# Patient Record
Sex: FEMALE | Race: WHITE | NOT HISPANIC OR LATINO | Employment: FULL TIME | URBAN - METROPOLITAN AREA
[De-identification: names, ages, dates, MRNs, and addresses within clinical notes are randomized per-mention and may not be internally consistent; named-entity substitution may affect disease eponyms.]

---

## 2024-09-26 LAB
HUMAN PAPILLOMAVIRUS (HPV): NORMAL
PAP RECOMMENDATION EXT: NORMAL
PAP SMEAR: NORMAL

## 2024-11-06 PROBLEM — Z97.3 WEARS CONTACT LENSES: Status: ACTIVE | Noted: 2024-11-06

## 2024-11-14 ENCOUNTER — PATIENT OUTREACH (OUTPATIENT)
Dept: ADMINISTRATIVE | Facility: HOSPITAL | Age: 34
End: 2024-11-14
Payer: COMMERCIAL

## 2024-11-14 ENCOUNTER — OFFICE VISIT (OUTPATIENT)
Dept: FAMILY MEDICINE | Facility: CLINIC | Age: 34
End: 2024-11-14
Payer: COMMERCIAL

## 2024-11-14 VITALS
WEIGHT: 167.75 LBS | BODY MASS INDEX: 28.64 KG/M2 | DIASTOLIC BLOOD PRESSURE: 88 MMHG | HEIGHT: 64 IN | OXYGEN SATURATION: 99 % | HEART RATE: 69 BPM | RESPIRATION RATE: 18 BRPM | SYSTOLIC BLOOD PRESSURE: 138 MMHG

## 2024-11-14 DIAGNOSIS — R01.1 HEART MURMUR: ICD-10-CM

## 2024-11-14 DIAGNOSIS — R03.0 ELEVATED BP WITHOUT DIAGNOSIS OF HYPERTENSION: ICD-10-CM

## 2024-11-14 DIAGNOSIS — Z00.01 ANNUAL VISIT FOR GENERAL ADULT MEDICAL EXAMINATION WITH ABNORMAL FINDINGS: Primary | ICD-10-CM

## 2024-11-14 PROCEDURE — 99999 PR PBB SHADOW E&M-EST. PATIENT-LVL IV: CPT | Mod: PBBFAC,,, | Performed by: FAMILY MEDICINE

## 2024-11-14 NOTE — PROGRESS NOTES
Assessment:       1. Annual visit for general adult medical examination with abnormal findings    2. Elevated BP without diagnosis of hypertension    3. Heart murmur        Assessment & Plan    Annual visit for general adult medical examination with abnormal findings  -     CBC Auto Differential; Future; Expected date: 11/14/2024  -     Comprehensive Metabolic Panel; Future; Expected date: 11/14/2024  -     Lipid Panel; Future; Expected date: 11/14/2024  -     TSH; Future; Expected date: 11/14/2024    Elevated BP without diagnosis of hypertension: Uncontrolled    Heart murmur: New problem workup needed  -     Echo; Future       Assessed elevated BP without formal diagnosis; today's readings 138/88 mmHg  Noted rare PVCs and PAs on previous 48-hour Holter monitor  Detected mild heart murmur on exam; family history of murmur and mother with mitral valve repair  Considered possible contributing factors to PVCs: dehydration, fertility treatment hormones, late eating  Evaluated previous lab results: CMP, CBC, liver enzymes, and cholesterol levels    HYPERTENSION:  Educated the patient on the importance of salt reduction for blood pressure management.  Discussed the benefits of adequate hydration for blood pressure control.  Advised the patient to increase water intake to maintain current level (2.5 32-ounce thermoses daily).  Recommend reducing salt intake, particularly avoiding table salt addition to cooked foods.  Instructed the patient to maintain current low alcohol intake.  Noted recent blood pressure readings: 145/90 during fertility treatments, 133/80 or 85 at home, and 138/88 during the current visit.  Advised the patient to log blood pressure readings for 1 month to determine if medication is needed.  Recommend dietary changes and exercise when allowed to help manage blood pressure naturally.  Scheduled follow up in 1 month if blood pressure log shows consistently elevated readings.    SUBCHORIONIC  HEMORRHAGE:  Noted that the patient's subchorionic hemorrhage seems to have resolved.  Reviewed ER visit labs showing slightly elevated white count, possibly due to the subchorionic hemorrhage.    DIABETES RISK:  Noted that mother has diabetes.  Acknowledged the patient's awareness of diabetes risk due to family history.    PALPITATIONS:  Discussed the benefits of adequate hydration for palpitations.  Explained the potential relationship between late eating and digestive issues affecting heart rhythm.  Noted that the patient reports daily PVCs, typically after eating.  Reviewed 48-hour Holter monitor results showing rare PVCs.  Discussed potential causes of PVCs, including dehydration and late-night eating.  Reviewed 48-hour Holter monitor results showing rare PACs.    HYPERLIPIDEMIA:  Noted that cholesterol was checked last year and was slightly high.  Discussed that LDL should be under 100, HDL was 79 (very good), and triglycerides were lower.  Recommend dietary changes to improve cholesterol levels.  Scheduled cholesterol recheck in 1 year.    HEART MURMUR:  Ordered an echocardiogram to evaluate mild heart murmur detected during exam.  Noted family history of heart murmur and mitral valve prolapse.        Noted that the patient is on progesterone and prenatal vitamins Instructed the patient to continue progesterone at the current dose.  Noted that the patient  is on activity restrictions.  Instructed the patient to continue prenatal vitamin supplementation.  Noted that the patient had her first OB appointment yesterday.  Reviewed OB-performed labs for hormones and other markers.    DIET AND NUTRITION:  Provided information on heart-healthy diet options, including Mediterranean and DASH diets.  Patient to limit fast food consumption.  Recommend allowing at least 3 hours between last meal and bedtime.  Patient to follow heart-healthy diet   (Mediterranean or DASH) including olive oil, avocados, fruits, and  vegetables.    EXERCISE:  Advised the patient to resume exercise once cleared by OB (currently on restriction).    HYDRATION:  Discussed benefits of adequate hydration for various health issues including headaches and joint pain.    FOLLOW UP:  Follow up in 1 year for annual check-up.  Contact the office if any concerns arise before next scheduled appointment.               Patient agreed with assessment and plan. Patient verbalized understanding.     Subjective:       Patient ID: Sil Zacarias is a 34 y.o. female.    Chief Complaint: Establish Care      History of Present Illness    CHIEF COMPLAINT:  Patient presents for an annual wellness visit and to establish care with a new provider after relocating.    HPI:  Patient reports a history of elevated blood pressure readings, particularly in medical settings. Blood pressure elevations to 145/90 were noted during fertility treatment appointments due to environmental stress. Home measurements with an electronic cuff typically show readings around 133/80 or 85, which she acknowledges as slightly elevated.    Patient's OB-GYN recommended maintaining a blood pressure log for a month, with potential medication consideration at her next appointment if readings remain consistently high. She is currently pregnant with a history of miscarriages, leading to exercise restrictions until the 12-week gestational serina.    Patient reports palpitations, specifically premature ventricular contractions (PVCs), which she documented on her Apple Watch. This led to a 48-hour Holter monitor test last October, revealing rare PVCs and premature atrial contractions (PACs). She continues to have daily PVCs, typically post-prandial.    Patient mentions a recent emergency room visit for a subchorionic hemorrhage, which has resolved. She also reports a history of headaches, believed to be related to dehydration. Increasing water intake to about 80 ounces daily has improved her  headaches.    Regarding sleep patterns, the patient reports generally adequate sleep but notes early morning awakening around 5 AM for urination, with difficulty returning to sleep, partly due to anticipating waking at 6:30 AM for medication.    Patient denies any current severe allergies, smoking, or significant alcohol consumption. She has not been formally diagnosed with hypertension.    MEDICATIONS:  Patient is on Progesterone and a prenatal vitamin.    MEDICAL HISTORY:  Patient has a history of elevated blood pressure without formal diagnosis, which is under observation. She also has premature ventricular contractions (PVCs), a history of miscarriages, and a mild heart murmur. She previously had a subchorionic hemorrhage, which has resolved. Patient's last Pap smear was performed by Dr. Ibarra, with records to be obtained. Her current contraception is progesterone. She is sexually active and currently pregnant. Patient has had multiple pregnancies, with a history of miscarriages and D&C procedures. She is currently on progesterone for her pregnancy.    FAMILY HISTORY:  Family history is significant for mother with diabetes, high blood pressure (resolved after mitral valve repair), and mitral valve repair. Her father has high blood pressure and a heart murmur. Patient's brother has autism and allergies.    SURGICAL HISTORY:  Patient has undergone a D&C procedure and wisdom teeth extraction of all teeth.    TEST RESULTS:  Patient's CMP shows sugar levels, kidney function, liver enzymes, and electrolytes within normal limits. Her CBC revealed a normal hemoglobin of 14.1. The cholesterol panel indicated elevated LDL (should be under   100), HDL of 79, and low triglycerides. Thyroid function test was normal at 2.4. During an ER visit, her labs showed a slightly elevated white blood cell count. Patient underwent a 48-hour Holter monitor in October last year, which showed rare premature atrial contractions (PACs) and  premature ventricular contractions (PVCs), with no intervention required. Hepatitis C and HIV tests performed at the [facility] were negative.    SOCIAL HISTORY:  Patient consumes 1-2 alcoholic drinks every 1-2 months. She does not smoke and has no history of drug abuse. She works as a physical therapist.    ROS:  ROS as indicated in HPI.          Past medical history, past social history, past Family history, past surgical history was reviewed and discussed with the patient.        Objective:      Physical Exam      Vitals: Weight: 167 lbs. Blood pressure: 138/88. Pulse: 69. SpO2: 99%.  General: No acute distress. Well-developed. Well-nourished.  Eyes: EOMI. Sclerae anicteric.  HENT: Normocephalic. Atraumatic. Nares patent. Moist oral mucosa.  Ears: Cerumen in ear.  Cardiovascular: Regular rate. Regular rhythm.  Presence of murmur. No rubs. No gallops. Normal S1, S2.  Respiratory: Normal respiratory effort. Clear to auscultation bilaterally. No rales. No rhonchi. No wheezing.  Musculoskeletal: No  obvious deformity.  Extremities: No lower extremity edema.  Neurological: Alert & oriented x3. No slurred speech. Normal gait.  Psychiatric: Normal mood. Normal affect. Good insight. Good judgment.  Skin: Warm. Dry. No rash.                   This note was generated with the assistance of ambient listening technology. Verbal consent was obtained by the patient and accompanying visitor(s) for the recording of patient appointment to facilitate this note. I attest to having reviewed and edited the generated note for accuracy, though some syntax or spelling errors may persist. Please contact the author of this note for any clarification.

## 2024-11-14 NOTE — PROGRESS NOTES
Population Health Chart Review & Patient Outreach Details      Additional Pop Health Notes:               Updates Requested / Reviewed:      Updated Care Coordination Note         Health Maintenance Topics Overdue:      VBHM Score: 0     Patient is not due for any topics at this time.                       Health Maintenance Topic(s) Outreach Outcomes & Actions Taken:    Cervical Cancer Screening - Outreach Outcomes & Actions Taken  : External Records Uploaded & Care Team Updated if Applicable

## 2024-11-17 ENCOUNTER — PATIENT MESSAGE (OUTPATIENT)
Dept: OPTOMETRY | Facility: CLINIC | Age: 34
End: 2024-11-17
Payer: COMMERCIAL

## 2024-12-06 ENCOUNTER — HOSPITAL ENCOUNTER (OUTPATIENT)
Dept: CARDIOLOGY | Facility: HOSPITAL | Age: 34
Discharge: HOME | End: 2024-12-06
Attending: FAMILY MEDICINE
Payer: COMMERCIAL

## 2024-12-06 VITALS — HEIGHT: 64 IN | WEIGHT: 167 LBS | BODY MASS INDEX: 28.51 KG/M2

## 2024-12-06 DIAGNOSIS — R01.1 HEART MURMUR: ICD-10-CM

## 2024-12-06 LAB
ASCENDING AORTA: 2.44 CM
AV INDEX (PROSTH): 0.65
AV MEAN GRADIENT: 9 MMHG
AV PEAK GRADIENT: 16 MMHG
AV VALVE AREA BY VELOCITY RATIO: 2 CM²
AV VALVE AREA: 1.9 CM²
AV VELOCITY RATIO: 0.7
BSA FOR ECHO PROCEDURE: 1.85 M2
CV ECHO LV RWT: 0.42 CM
DOP CALC AO PEAK VEL: 2 M/S
DOP CALC AO VTI: 42.3 CM
DOP CALC LVOT AREA: 2.8 CM2
DOP CALC LVOT DIAMETER: 1.9 CM
DOP CALC LVOT PEAK VEL: 1.4 M/S
DOP CALC LVOT STROKE VOLUME: 78.5 CM3
DOP CALCLVOT PEAK VEL VTI: 27.7 CM
E WAVE DECELERATION TIME: 226.99 MSEC
E/A RATIO: 1.33
E/E' RATIO: 5.88 M/S
ECHO LV POSTERIOR WALL: 1 CM (ref 0.6–1.1)
FRACTIONAL SHORTENING: 37.5 % (ref 28–44)
INTERVENTRICULAR SEPTUM: 0.9 CM (ref 0.6–1.1)
IVRT: 91.34 MSEC
LEFT ATRIUM AREA SYSTOLIC (APICAL 2 CHAMBER): 18.28 CM2
LEFT ATRIUM AREA SYSTOLIC (APICAL 4 CHAMBER): 19.37 CM2
LEFT ATRIUM SIZE: 3.52 CM
LEFT ATRIUM VOLUME INDEX MOD: 30.5 ML/M2
LEFT ATRIUM VOLUME MOD: 55.28 ML
LEFT INTERNAL DIMENSION IN SYSTOLE: 3 CM (ref 2.1–4)
LEFT VENTRICLE DIASTOLIC VOLUME INDEX: 59.62 ML/M2
LEFT VENTRICLE DIASTOLIC VOLUME: 107.91 ML
LEFT VENTRICLE END SYSTOLIC VOLUME APICAL 2 CHAMBER: 53.72 ML
LEFT VENTRICLE END SYSTOLIC VOLUME APICAL 4 CHAMBER: 56.32 ML
LEFT VENTRICLE MASS INDEX: 87.7 G/M2
LEFT VENTRICLE SYSTOLIC VOLUME INDEX: 18.7 ML/M2
LEFT VENTRICLE SYSTOLIC VOLUME: 33.84 ML
LEFT VENTRICULAR INTERNAL DIMENSION IN DIASTOLE: 4.8 CM (ref 3.5–6)
LEFT VENTRICULAR MASS: 158.8 G
LV LATERAL E/E' RATIO: 5.11 M/S
LV SEPTAL E/E' RATIO: 6.93 M/S
LVED V (TEICH): 107.91 ML
LVES V (TEICH): 33.84 ML
LVOT MG: 4.31 MMHG
LVOT MV: 0.98 CM/S
MV PEAK A VEL: 0.73 M/S
MV PEAK E VEL: 0.97 M/S
MV STENOSIS PRESSURE HALF TIME: 65.83 MS
MV VALVE AREA P 1/2 METHOD: 3.34 CM2
PISA TR MAX VEL: 2.38 M/S
PULM VEIN S/D RATIO: 1.01
PV PEAK D VEL: 0.68 M/S
PV PEAK S VEL: 0.69 M/S
RA PRESSURE ESTIMATED: 3 MMHG
RA VOL SYS: 34.71 ML
RIGHT ATRIAL AREA: 13.8 CM2
RIGHT ATRIUM VOLUME AREA LENGTH APICAL 4 CHAMBER: 33.27 ML
RIGHT VENTRICLE DIASTOLIC LENGTH: 7.9 CM
RIGHT VENTRICLE DIASTOLIC MID DIMENSION: 2.2 CM
RIGHT VENTRICULAR END-DIASTOLIC DIMENSION: 3.34 CM
RIGHT VENTRICULAR LENGTH IN DIASTOLE (APICAL 4-CHAMBER VIEW): 7.88 CM
RV MID DIAMA: 2.15 CM
RV TB RVSP: 5 MMHG
RV TISSUE DOPPLER FREE WALL SYSTOLIC VELOCITY 1 (APICAL 4 CHAMBER VIEW): 18.23 CM/S
SINUS: 2.43 CM
STJ: 2.45 CM
TDI LATERAL: 0.19 M/S
TDI SEPTAL: 0.14 M/S
TDI: 0.17 M/S
TR MAX PG: 23 MMHG
TRICUSPID ANNULAR PLANE SYSTOLIC EXCURSION: 2.27 CM
TV REST PULMONARY ARTERY PRESSURE: 26 MMHG
Z-SCORE OF LEFT VENTRICULAR DIMENSION IN END DIASTOLE: -0.42
Z-SCORE OF LEFT VENTRICULAR DIMENSION IN END SYSTOLE: -0.24

## 2024-12-06 PROCEDURE — 93306 TTE W/DOPPLER COMPLETE: CPT | Mod: PO

## 2024-12-06 PROCEDURE — 93306 TTE W/DOPPLER COMPLETE: CPT | Mod: 26,,, | Performed by: INTERNAL MEDICINE

## 2024-12-16 ENCOUNTER — PATIENT MESSAGE (OUTPATIENT)
Dept: FAMILY MEDICINE | Facility: CLINIC | Age: 34
End: 2024-12-16
Payer: COMMERCIAL

## 2024-12-27 ENCOUNTER — PATIENT MESSAGE (OUTPATIENT)
Dept: OPTOMETRY | Facility: CLINIC | Age: 34
End: 2024-12-27
Payer: COMMERCIAL

## 2025-02-04 ENCOUNTER — OFFICE VISIT (OUTPATIENT)
Dept: MATERNAL FETAL MEDICINE | Facility: CLINIC | Age: 35
End: 2025-02-04
Payer: COMMERCIAL

## 2025-02-04 ENCOUNTER — PROCEDURE VISIT (OUTPATIENT)
Dept: MATERNAL FETAL MEDICINE | Facility: CLINIC | Age: 35
End: 2025-02-04
Payer: COMMERCIAL

## 2025-02-04 VITALS
HEIGHT: 64 IN | WEIGHT: 187.06 LBS | SYSTOLIC BLOOD PRESSURE: 130 MMHG | HEART RATE: 74 BPM | BODY MASS INDEX: 31.94 KG/M2 | DIASTOLIC BLOOD PRESSURE: 74 MMHG

## 2025-02-04 DIAGNOSIS — O10.912 CHRONIC HYPERTENSION COMPLICATING OR REASON FOR CARE DURING PREGNANCY, SECOND TRIMESTER: Primary | ICD-10-CM

## 2025-02-04 DIAGNOSIS — Z78.9 CONCEIVED BY IN VITRO FERTILIZATION: ICD-10-CM

## 2025-02-04 DIAGNOSIS — O30.042 DICHORIONIC DIAMNIOTIC TWIN PREGNANCY IN SECOND TRIMESTER: ICD-10-CM

## 2025-02-04 DIAGNOSIS — O09.812 PREGNANCY RESULTING FROM IN VITRO FERTILIZATION IN SECOND TRIMESTER: ICD-10-CM

## 2025-02-04 DIAGNOSIS — Z36.89 ENCOUNTER FOR FETAL ANATOMIC SURVEY: ICD-10-CM

## 2025-02-04 DIAGNOSIS — Z82.79 FAMILY HISTORY OF CONGENITAL HEART DISEASE: ICD-10-CM

## 2025-02-04 PROCEDURE — 3075F SYST BP GE 130 - 139MM HG: CPT | Mod: CPTII,S$GLB,, | Performed by: STUDENT IN AN ORGANIZED HEALTH CARE EDUCATION/TRAINING PROGRAM

## 2025-02-04 PROCEDURE — 1159F MED LIST DOCD IN RCRD: CPT | Mod: CPTII,S$GLB,, | Performed by: STUDENT IN AN ORGANIZED HEALTH CARE EDUCATION/TRAINING PROGRAM

## 2025-02-04 PROCEDURE — 76817 TRANSVAGINAL US OBSTETRIC: CPT | Mod: S$GLB,,, | Performed by: STUDENT IN AN ORGANIZED HEALTH CARE EDUCATION/TRAINING PROGRAM

## 2025-02-04 PROCEDURE — 76812 OB US DETAILED ADDL FETUS: CPT | Mod: S$GLB,,, | Performed by: STUDENT IN AN ORGANIZED HEALTH CARE EDUCATION/TRAINING PROGRAM

## 2025-02-04 PROCEDURE — 99205 OFFICE O/P NEW HI 60 MIN: CPT | Mod: S$GLB,,, | Performed by: STUDENT IN AN ORGANIZED HEALTH CARE EDUCATION/TRAINING PROGRAM

## 2025-02-04 PROCEDURE — 3008F BODY MASS INDEX DOCD: CPT | Mod: CPTII,S$GLB,, | Performed by: STUDENT IN AN ORGANIZED HEALTH CARE EDUCATION/TRAINING PROGRAM

## 2025-02-04 PROCEDURE — 99999 PR PBB SHADOW E&M-EST. PATIENT-LVL III: CPT | Mod: PBBFAC,,, | Performed by: STUDENT IN AN ORGANIZED HEALTH CARE EDUCATION/TRAINING PROGRAM

## 2025-02-04 PROCEDURE — 3078F DIAST BP <80 MM HG: CPT | Mod: CPTII,S$GLB,, | Performed by: STUDENT IN AN ORGANIZED HEALTH CARE EDUCATION/TRAINING PROGRAM

## 2025-02-04 PROCEDURE — 76811 OB US DETAILED SNGL FETUS: CPT | Mod: S$GLB,,, | Performed by: STUDENT IN AN ORGANIZED HEALTH CARE EDUCATION/TRAINING PROGRAM

## 2025-02-04 RX ORDER — FOLIC ACID 1 MG/1
1 TABLET ORAL DAILY
COMMUNITY

## 2025-02-04 RX ORDER — LABETALOL 100 MG/1
100 TABLET, FILM COATED ORAL 2 TIMES DAILY
COMMUNITY

## 2025-02-04 RX ORDER — MV-MN/IRON/FA/OM3/DHA/EPA/HB/D 27 MG-360
COMBINATION PACKAGE (EA) ORAL
COMMUNITY

## 2025-02-04 NOTE — ASSESSMENT & PLAN NOTE
Dichorionic-Diamniotic Twins  Today I discussed with the patient the finding of a dichorionic-diamniotic twin pregnancy and the risks associated with this type of pregnancy. I counseled her on the increased incidence of preeclampsia/gestational hypertension, gestational diabetes, fetal growth restriction, anemia, congenital anomalies, need for antepartum hospitalization,  labor/PPROM, stillbirth, and risk of postpartum hemorrhage that can occur in twin pregnancies. We discussed plans for monthly ultrasound surveillance looking for signs of fetal growth restriction.     Recommendations (Please refer to [facility] guidelines):   Detailed anatomy surveys at 19-20 weeks   Transvaginal cervical length screening at time of anatomy scan.   Fetal growth ultrasounds every 3-4 weeks starting at around 24 weeks  Folic acid 1 mg daily and ferrous sulfate 325 mg daily  Increase daily dietary intake by approximately 300 kcal above that for a la pregnancy, or 600 kcal over that of a nonpregnant woman and monitor weight gain  Twice weekly  testing (NST and MVP) assessment beginning at 32 weeks due to CHTN, IVF, and twin pregnancy  Given the increased risks of a twin pregnancy, prenatal visits every 2-3 weeks from 24 - 32/34 weeks and weekly prenatal visits thereafter    Delivery timing:   Normal growth and testin 0/7 - 38 6/7 weeks gestation   Normal growth, comorbid condition: 37 0/7 - 37 6/7 weeks gestation   FGR of one or both: 36 0/7 - 36 6/7 weeks gestation   FGR with abnormal UA Doppler, oligohydramnios, or multiple comorbid conditions: 32 0/7 - 34 6/7 weeks gestation    Comorbid conditions include: BMI >= 40, diabetes, hypertension, and complex maternal medical conditions associated with placental dysfunction (Lupus, renal disease, or other vascular disease).

## 2025-02-04 NOTE — PROGRESS NOTES
Pt conceived through IVF, Dr. Shipman. Reports habitual SABs x 3, then 2 IVF early losses. This pregnancy is conceived via IVF. One embryo transferred, suspected second baby per natural conception.

## 2025-02-04 NOTE — ASSESSMENT & PLAN NOTE
Father of twin pregnancy reports congenital heart defect which did not require surgery and resolved on its own. I offered them a fetal echocardiogram referral if desired. They will acquire further details from his mom and call back for referral if desired.    Twin B's cardiac evaluation completed and appears normal.  Twin A's cardiac anatomy is incomplete- we have scheduled them for a follow up anatomy. If remains suboptimal, will refer to Ped Cardiology due to IVF history.

## 2025-02-04 NOTE — ASSESSMENT & PLAN NOTE
Chronic Hypertension  Today I counseled the patient on maternal/fetal risks associated with CHTN during pregnancy. Risks include but not limited to fetal growth restriction, miscarriage, abruption, maternal end organ disease (renal failure, MI, and stroke),  delivery, development of superimposed preeclampsia, and eclampsia. She was counseled on the recommendations for blood pressure control, serial ultrasound for fetal growth assessment and  testing, and timing of delivery. I also counseled her on the recommendation for aspirin 81 mg daily which may decrease her risk of developing superimposed preeclampsia.     Recommendations (Please refer to [facility] guidelines):  Initiate aspirin 81 mg daily  for preeclampsia risk reduction  Continue current medications: Labetalol 100 BID  Baseline evaluation with primary OB:   24-hour urine protein or baseline P/C ratio, CMP, and CBC.  Maternal EKG  Maternal ophthalmic evaluation  Maternal echocardiogram if HTN has been long-standing or EKG is abnormal  Continued close observation of patient's blood pressures. Avoid hypotension as this has been associated with uteroplacental insufficiency.  Recommend treatment to a goal blood pressure < 140/90

## 2025-02-04 NOTE — ASSESSMENT & PLAN NOTE
In Vitro Fertilization  The use of IVF has been associated with an increase in preeclampsia, gestational hypertension, placental abruption, placenta previa, and risk of  delivery. Due to the potential increased risk of congenital malformations following IVF, a targeted anatomic ultrasound is recommended at 18-20 weeks. In addition, a fetal echocardiogram may be considered if the targeted images are incomplete given the 2-fold associated risk of congenital cardiac anomalies.  While the association between ART and these adverse outcomes raise some concern, it is important to note that the literature is contradictory on this subject and the chances of having a healthy child conceived with ART are overall extremely high.    NIPT requested

## 2025-02-04 NOTE — PROGRESS NOTES
"MATERNAL-FETAL MEDICINE   CONSULT NOTE    Provider requesting consultation: Dr. Ibarra  SUBJECTIVE:   Ms. Sil Zacarias is a 34 y.o.  female with IUP at 20w3d who is seen in consultation by MFM for evaluation and management of:  Problem   Pregnancy Resulting From in Vitro Fertilization in Second Trimester   Chronic Hypertension Complicating Or Reason for Care During Pregnancy, Second Trimester   Dichorionic Diamniotic Twin Pregnancy in Second Trimester   Family History of Congenital Heart Disease       Review of patient's allergies indicates:  No Known Allergies  OB History    Para Term  AB Living   6       5     SAB IAB Ectopic Multiple Live Births   5              # Outcome Date GA Lbr Ricki/2nd Weight Sex Type Anes PTL Lv   6 Current            5 2024 6w0d             Complications: Conceived by in vitro fertilization   4 2023 3w0d             Complications: Conceived by in vitro fertilization   3 2022 4w0d          2 2022 7w0d          1 2022 6w0d            Past Medical History:   Diagnosis Date    Hypertension      Past Surgical History:   Procedure Laterality Date    DILATION AND CURETTAGE OF UTERUS      WISDOM TOOTH EXTRACTION       Family history: reviewed  Social History     Tobacco Use    Smoking status: Never   Substance Use Topics    Alcohol use: Yes     Comment: social    Drug use: No     Review of patient's allergies indicates:  No Known Allergies  Objective:   /74   Pulse 74   Ht 5' 4" (1.626 m)   Wt 84.8 kg (187 lb 1 oz)   BMI 32.11 kg/m²   Ultrasound performed. See viewpoint for full ultrasound report.  1. Dichorionic-diamniotic twin pregnancy  2. Twin A:  - No fetal structural malformations are identified; however, fetal imaging is incomplete today.   - Fetal size is appropriate for EGA  - Amniotic fluid level is normal   - Placenta site is posterior without evidence of previa  3. Twin B:  - No fetal structural abnormalities are " identified today.  - Fetal size is appropriate for EGA  - Amniotic fluid level is normal  - Placenta site is anterior without evidence of previa  4. Transvaginal cervical length measures normal at 43 mm  5. Placental location is posterior without evidence of previa  ASSESSMENT/PLAN:     34 y.o.  female with IUP at 20w3d     Pregnancy resulting from in vitro fertilization in second trimester  In Vitro Fertilization  The use of IVF has been associated with an increase in preeclampsia, gestational hypertension, placental abruption, placenta previa, and risk of  delivery. Due to the potential increased risk of congenital malformations following IVF, a targeted anatomic ultrasound is recommended at 18-20 weeks. In addition, a fetal echocardiogram may be considered if the targeted images are incomplete given the 2-fold associated risk of congenital cardiac anomalies.  While the association between ART and these adverse outcomes raise some concern, it is important to note that the literature is contradictory on this subject and the chances of having a healthy child conceived with ART are overall extremely high.    NIPT requested        Chronic hypertension complicating or reason for care during pregnancy, second trimester  Chronic Hypertension  Today I counseled the patient on maternal/fetal risks associated with CHTN during pregnancy. Risks include but not limited to fetal growth restriction, miscarriage, abruption, maternal end organ disease (renal failure, MI, and stroke),  delivery, development of superimposed preeclampsia, and eclampsia. She was counseled on the recommendations for blood pressure control, serial ultrasound for fetal growth assessment and  testing, and timing of delivery. I also counseled her on the recommendation for aspirin 81 mg daily which may decrease her risk of developing superimposed preeclampsia.     Recommendations (Please refer to [facility]  guidelines):  Initiate aspirin 81 mg daily  for preeclampsia risk reduction  Continue current medications: Labetalol 100 BID  Baseline evaluation with primary OB:   24-hour urine protein or baseline P/C ratio, CMP, and CBC.  Maternal EKG  Maternal ophthalmic evaluation  Maternal echocardiogram if HTN has been long-standing or EKG is abnormal  Continued close observation of patient's blood pressures. Avoid hypotension as this has been associated with uteroplacental insufficiency.  Recommend treatment to a goal blood pressure < 140/90        Dichorionic diamniotic twin pregnancy in second trimester  Dichorionic-Diamniotic Twins  Today I discussed with the patient the finding of a dichorionic-diamniotic twin pregnancy and the risks associated with this type of pregnancy. I counseled her on the increased incidence of preeclampsia/gestational hypertension, gestational diabetes, fetal growth restriction, anemia, congenital anomalies, need for antepartum hospitalization,  labor/PPROM, stillbirth, and risk of postpartum hemorrhage that can occur in twin pregnancies. We discussed plans for monthly ultrasound surveillance looking for signs of fetal growth restriction.     Recommendations (Please refer to [facility] guidelines):   Detailed anatomy surveys at 19-20 weeks   Transvaginal cervical length screening at time of anatomy scan.   Fetal growth ultrasounds every 3-4 weeks starting at around 24 weeks  Folic acid 1 mg daily and ferrous sulfate 325 mg daily  Increase daily dietary intake by approximately 300 kcal above that for a la pregnancy, or 600 kcal over that of a nonpregnant woman and monitor weight gain  Twice weekly  testing (NST and MVP) assessment beginning at 32 weeks due to CHTN, IVF, and twin pregnancy  Given the increased risks of a twin pregnancy, prenatal visits every 2-3 weeks from 24 - 32/34 weeks and weekly prenatal visits thereafter    Delivery timing:   Normal growth and testing:  38 0/7 - 38 6/7 weeks gestation   Normal growth, comorbid condition: 37 0/7 - 37 6/7 weeks gestation   FGR of one or both: 36 0/7 - 36 6/7 weeks gestation   FGR with abnormal UA Doppler, oligohydramnios, or multiple comorbid conditions: 32 0/7 - 34 6/7 weeks gestation    Comorbid conditions include: BMI >= 40, diabetes, hypertension, and complex maternal medical conditions associated with placental dysfunction (Lupus, renal disease, or other vascular disease).      Family history of congenital heart disease  Father of twin pregnancy reports congenital heart defect which did not require surgery and resolved on its own. I offered them a fetal echocardiogram referral if desired. They will acquire further details from his mom and call back for referral if desired.    Twin B's cardiac evaluation completed and appears normal.  Twin A's cardiac anatomy is incomplete- we have scheduled them for a follow up anatomy. If remains suboptimal, will refer to Ped Cardiology due to IVF history.    FOLLOW UP:   F/u in 4 weeks for US/MFM visit      Sade Brizuela  Maternal-Fetal Medicine    Electronically Signed by Sade Brizuela February 4, 2025

## 2025-02-26 ENCOUNTER — PATIENT MESSAGE (OUTPATIENT)
Dept: MATERNAL FETAL MEDICINE | Facility: CLINIC | Age: 35
End: 2025-02-26
Payer: COMMERCIAL

## 2025-02-28 ENCOUNTER — PATIENT MESSAGE (OUTPATIENT)
Dept: MATERNAL FETAL MEDICINE | Facility: CLINIC | Age: 35
End: 2025-02-28
Payer: COMMERCIAL

## 2025-03-06 ENCOUNTER — OFFICE VISIT (OUTPATIENT)
Dept: MATERNAL FETAL MEDICINE | Facility: CLINIC | Age: 35
End: 2025-03-06
Payer: COMMERCIAL

## 2025-03-06 ENCOUNTER — PROCEDURE VISIT (OUTPATIENT)
Dept: MATERNAL FETAL MEDICINE | Facility: CLINIC | Age: 35
End: 2025-03-06
Payer: COMMERCIAL

## 2025-03-06 VITALS
DIASTOLIC BLOOD PRESSURE: 80 MMHG | HEIGHT: 64 IN | SYSTOLIC BLOOD PRESSURE: 136 MMHG | HEART RATE: 80 BPM | WEIGHT: 189.63 LBS | BODY MASS INDEX: 32.37 KG/M2

## 2025-03-06 DIAGNOSIS — O10.912 CHRONIC HYPERTENSION COMPLICATING OR REASON FOR CARE DURING PREGNANCY, SECOND TRIMESTER: ICD-10-CM

## 2025-03-06 DIAGNOSIS — O09.812 PREGNANCY RESULTING FROM IN VITRO FERTILIZATION IN SECOND TRIMESTER: Primary | ICD-10-CM

## 2025-03-06 DIAGNOSIS — O30.042 DICHORIONIC DIAMNIOTIC TWIN PREGNANCY IN SECOND TRIMESTER: ICD-10-CM

## 2025-03-06 DIAGNOSIS — Z36.89 ENCOUNTER FOR ULTRASOUND TO ASSESS FETAL GROWTH: ICD-10-CM

## 2025-03-06 DIAGNOSIS — Z78.9 CONCEIVED BY IN VITRO FERTILIZATION: ICD-10-CM

## 2025-03-06 PROCEDURE — 1159F MED LIST DOCD IN RCRD: CPT | Mod: CPTII,S$GLB,, | Performed by: STUDENT IN AN ORGANIZED HEALTH CARE EDUCATION/TRAINING PROGRAM

## 2025-03-06 PROCEDURE — 76816 OB US FOLLOW-UP PER FETUS: CPT | Mod: 59,S$GLB,, | Performed by: STUDENT IN AN ORGANIZED HEALTH CARE EDUCATION/TRAINING PROGRAM

## 2025-03-06 PROCEDURE — 99214 OFFICE O/P EST MOD 30 MIN: CPT | Mod: S$GLB,,, | Performed by: STUDENT IN AN ORGANIZED HEALTH CARE EDUCATION/TRAINING PROGRAM

## 2025-03-06 PROCEDURE — 3075F SYST BP GE 130 - 139MM HG: CPT | Mod: CPTII,S$GLB,, | Performed by: STUDENT IN AN ORGANIZED HEALTH CARE EDUCATION/TRAINING PROGRAM

## 2025-03-06 PROCEDURE — 3008F BODY MASS INDEX DOCD: CPT | Mod: CPTII,S$GLB,, | Performed by: STUDENT IN AN ORGANIZED HEALTH CARE EDUCATION/TRAINING PROGRAM

## 2025-03-06 PROCEDURE — 3079F DIAST BP 80-89 MM HG: CPT | Mod: CPTII,S$GLB,, | Performed by: STUDENT IN AN ORGANIZED HEALTH CARE EDUCATION/TRAINING PROGRAM

## 2025-03-06 PROCEDURE — 99999 PR PBB SHADOW E&M-EST. PATIENT-LVL III: CPT | Mod: PBBFAC,,, | Performed by: STUDENT IN AN ORGANIZED HEALTH CARE EDUCATION/TRAINING PROGRAM

## 2025-03-06 NOTE — ASSESSMENT & PLAN NOTE
Chronic Hypertension  Previously counseled  Recommendations (Please refer to [facility] guidelines):  Initiate aspirin 81 mg daily  for preeclampsia risk reduction  Continue current medications: Labetalol 100 BID  Baseline evaluation with primary OB: outlined previously  Continued close observation of patient's blood pressures. Avoid hypotension as this has been associated with uteroplacental insufficiency.  Recommend treatment to a goal blood pressure < 140/90

## 2025-03-06 NOTE — PROGRESS NOTES
"Maternal Fetal Medicine Follow up  SUBJECTIVE:     Sil Zacarias is a 34 y.o.  female with IUP at 24w5d who is seen for [facility] follow up for management of:    Problem   Pregnancy Resulting From in Vitro Fertilization in Second Trimester   Chronic Hypertension Complicating Or Reason for Care During Pregnancy, Second Trimester     Previous notes reviewed.   No changes to medical, surgical, family, social, or obstetric history.      Review of patient's allergies indicates:  No Known Allergies  Care team members:  Dr. Ibarra - Primary OB  OBJECTIVE:   Blood Pressure: /80   Pulse 80   Ht 5' 4" (1.626 m)   Wt 86 kg (189 lb 9.5 oz)   BMI 32.54 kg/m²   Ultrasound performed. See viewpoint for full ultrasound report.  1. Dichorionic-diamniotic twin pregnancy   2. Twin A  - Fetal size is AGA with the EFW at the 51%.  AC is at the 47%.  -Normal repeat limited fetal anatomic survey.  - MVP is normal amount.   - Presentation is cephalic left lower, presenting.  3. Twin B  - Fetal size is AGA with the EFW at the 55%. AC is at the 69%.  - Normal repeat limited fetal anatomic survey.  - MVP is normal amount.   - Presentation is breech right, non-presenting.  4. Growth is non-discordant at  3.9 %.  ASSESSMENT/PLAN:     34 y.o.  female with IUP at 24w5d presents for [facility] follow up.    Pregnancy resulting from in vitro fertilization in second trimester  Previously counseled    Chronic hypertension complicating or reason for care during pregnancy, second trimester  Chronic Hypertension  Previously counseled  Recommendations (Please refer to [facility] guidelines):  Initiate aspirin 81 mg daily  for preeclampsia risk reduction  Continue current medications: Labetalol 100 BID  Baseline evaluation with primary OB: outlined previously  Continued close observation of patient's blood pressures. Avoid hypotension as this has been associated with uteroplacental insufficiency.  Recommend treatment to a goal blood pressure < " 140/90        FOLLOW UP:   32 wk MFM visit  Continue serial growths with Dr. Fred Brizuela  Maternal-Fetal Medicine    Electronically Signed by Sade Brizuela March 9, 2025

## 2025-03-08 ENCOUNTER — PATIENT MESSAGE (OUTPATIENT)
Dept: MATERNAL FETAL MEDICINE | Facility: CLINIC | Age: 35
End: 2025-03-08
Payer: COMMERCIAL

## 2025-03-10 ENCOUNTER — PATIENT MESSAGE (OUTPATIENT)
Dept: MATERNAL FETAL MEDICINE | Facility: CLINIC | Age: 35
End: 2025-03-10
Payer: COMMERCIAL

## 2025-03-28 ENCOUNTER — PATIENT MESSAGE (OUTPATIENT)
Dept: MATERNAL FETAL MEDICINE | Facility: CLINIC | Age: 35
End: 2025-03-28
Payer: COMMERCIAL

## 2025-03-29 ENCOUNTER — PATIENT MESSAGE (OUTPATIENT)
Dept: MATERNAL FETAL MEDICINE | Facility: CLINIC | Age: 35
End: 2025-03-29
Payer: COMMERCIAL

## 2025-04-29 ENCOUNTER — OFFICE VISIT (OUTPATIENT)
Dept: MATERNAL FETAL MEDICINE | Facility: CLINIC | Age: 35
End: 2025-04-29
Payer: COMMERCIAL

## 2025-04-29 ENCOUNTER — PROCEDURE VISIT (OUTPATIENT)
Dept: MATERNAL FETAL MEDICINE | Facility: CLINIC | Age: 35
End: 2025-04-29
Payer: COMMERCIAL

## 2025-04-29 VITALS
DIASTOLIC BLOOD PRESSURE: 84 MMHG | HEIGHT: 64 IN | WEIGHT: 206.25 LBS | SYSTOLIC BLOOD PRESSURE: 136 MMHG | HEART RATE: 94 BPM | BODY MASS INDEX: 35.21 KG/M2

## 2025-04-29 DIAGNOSIS — Z36.89 ENCOUNTER FOR ULTRASOUND TO ASSESS FETAL GROWTH: ICD-10-CM

## 2025-04-29 DIAGNOSIS — O30.042 DICHORIONIC DIAMNIOTIC TWIN PREGNANCY IN SECOND TRIMESTER: Primary | ICD-10-CM

## 2025-04-29 DIAGNOSIS — O10.912 CHRONIC HYPERTENSION COMPLICATING OR REASON FOR CARE DURING PREGNANCY, SECOND TRIMESTER: ICD-10-CM

## 2025-04-29 DIAGNOSIS — Z78.9 CONCEIVED BY IN VITRO FERTILIZATION: ICD-10-CM

## 2025-04-29 DIAGNOSIS — O30.042 DICHORIONIC DIAMNIOTIC TWIN PREGNANCY IN SECOND TRIMESTER: ICD-10-CM

## 2025-04-29 PROCEDURE — 1160F RVW MEDS BY RX/DR IN RCRD: CPT | Mod: CPTII,S$GLB,, | Performed by: STUDENT IN AN ORGANIZED HEALTH CARE EDUCATION/TRAINING PROGRAM

## 2025-04-29 PROCEDURE — 76816 OB US FOLLOW-UP PER FETUS: CPT | Mod: 59,S$GLB,, | Performed by: STUDENT IN AN ORGANIZED HEALTH CARE EDUCATION/TRAINING PROGRAM

## 2025-04-29 PROCEDURE — 99999 PR PBB SHADOW E&M-EST. PATIENT-LVL III: CPT | Mod: PBBFAC,,, | Performed by: STUDENT IN AN ORGANIZED HEALTH CARE EDUCATION/TRAINING PROGRAM

## 2025-04-29 PROCEDURE — 1159F MED LIST DOCD IN RCRD: CPT | Mod: CPTII,S$GLB,, | Performed by: STUDENT IN AN ORGANIZED HEALTH CARE EDUCATION/TRAINING PROGRAM

## 2025-04-29 PROCEDURE — 99214 OFFICE O/P EST MOD 30 MIN: CPT | Mod: S$GLB,,, | Performed by: STUDENT IN AN ORGANIZED HEALTH CARE EDUCATION/TRAINING PROGRAM

## 2025-04-29 PROCEDURE — 3079F DIAST BP 80-89 MM HG: CPT | Mod: CPTII,S$GLB,, | Performed by: STUDENT IN AN ORGANIZED HEALTH CARE EDUCATION/TRAINING PROGRAM

## 2025-04-29 PROCEDURE — 3008F BODY MASS INDEX DOCD: CPT | Mod: CPTII,S$GLB,, | Performed by: STUDENT IN AN ORGANIZED HEALTH CARE EDUCATION/TRAINING PROGRAM

## 2025-04-29 PROCEDURE — 3075F SYST BP GE 130 - 139MM HG: CPT | Mod: CPTII,S$GLB,, | Performed by: STUDENT IN AN ORGANIZED HEALTH CARE EDUCATION/TRAINING PROGRAM

## 2025-04-29 PROCEDURE — 76819 FETAL BIOPHYS PROFIL W/O NST: CPT | Mod: 59,S$GLB,, | Performed by: STUDENT IN AN ORGANIZED HEALTH CARE EDUCATION/TRAINING PROGRAM

## 2025-04-29 NOTE — PROGRESS NOTES
"Maternal Fetal Medicine Follow up  SUBJECTIVE:     Sil Zacarias is a 34 y.o.  female with IUP at 32w3d who is seen for [facility] follow up for management of:    Problem   Chronic Hypertension Complicating Or Reason for Care During Pregnancy, Second Trimester   Dichorionic Diamniotic Twin Pregnancy in Second Trimester     Previous notes reviewed.   No changes to medical, surgical, family, social, or obstetric history.    Review of patient's allergies indicates:  No Known Allergies  Care team members:  Dr. Ibarra - Primary OB  OBJECTIVE:   Blood Pressure: /84   Pulse 94   Ht 5' 4" (1.626 m)   Wt 93.6 kg (206 lb 3.9 oz)   BMI 35.40 kg/m²   Ultrasound performed. See viewpoint for full ultrasound report.  1. Dichorionic-diamniotic twin pregnancy   2. Twin A  - Fetal size is AGA with the EFW at the 23%.  AC is at the 18%.  - Normal repeat limited fetal anatomic survey.   - MVP is normal amount.   - BPP is reassuring at 8/8 and MVP is normal amount.  - Presentation is cephalic left, presenting.  3. Twin B  - Fetal size is AGA with the EFW at the 30%. AC is at the 77%.  - Normal repeat limited fetal anatomic survey.  - Bladder is visualized.  - MVP is normal amount.   - BPP is reassuring at 8/8 and MVP is normal amount.  - Presentation is breech right, non-presenting.  4. Growth is non-discordant at  5.4 %.  ASSESSMENT/PLAN:     34 y.o.  female with IUP at 32w3d presents for [facility] follow up.    Dichorionic diamniotic twin pregnancy in second trimester  Dichorionic-Diamniotic Twins  Previously consulted  AGA growth x 2  Recommendations (Please refer to [facility] guidelines):   Fetal growth ultrasounds every 3-4 weeks starting at around 24 weeks- next ultrasound can be scheduled with Dr. Ibarra  Folic acid 1 mg daily and ferrous sulfate 325 mg daily  Increase daily dietary intake by approximately 300 kcal above that for a la pregnancy, or 600 kcal over that of a nonpregnant woman and monitor weight " gain  Twice weekly  testing (NST and MVP) assessment beginning at 32 weeks due to CHTN, IVF, and twin pregnancy  Given the increased risks of a twin pregnancy, prenatal visits every 2-3 weeks from 24 - 32/34 weeks and weekly prenatal visits thereafter    Delivery timing:   Normal growth and testin 0/7 - 38 6/7 weeks gestation   Normal growth, comorbid condition: 37 0/7 - 37 6/7 weeks gestation   FGR of one or both: 36 0/7 - 36 6/7 weeks gestation   FGR with abnormal UA Doppler, oligohydramnios, or multiple comorbid conditions: 32 0/7 - 34 6/7 weeks gestation    Comorbid conditions include: BMI >= 40, diabetes, hypertension, and complex maternal medical conditions associated with placental dysfunction (Lupus, renal disease, or other vascular disease).      Chronic hypertension complicating or reason for care during pregnancy, second trimester  Chronic Hypertension  Previously counseled  Recommendations (Please refer to [facility] guidelines):  Initiate aspirin 81 mg daily  for preeclampsia risk reduction  Continue current medications: Labetalol 100 BID  Baseline evaluation with primary OB: outlined previously  Continued close observation of patient's blood pressures. Avoid hypotension as this has been associated with uteroplacental insufficiency.  Recommend treatment to a goal blood pressure < 140/90        FOLLOW UP:   No further ultrasounds or visits were scheduled      Sade Brizuela  Maternal-Fetal Medicine    Electronically Signed by Sade Brizuela 2025

## 2025-04-29 NOTE — ASSESSMENT & PLAN NOTE
Dichorionic-Diamniotic Twins  Previously consulted  AGA growth x 2  Recommendations (Please refer to [facility] guidelines):   Fetal growth ultrasounds every 3-4 weeks starting at around 24 weeks- next ultrasound can be scheduled with Dr. Ibarra  Folic acid 1 mg daily and ferrous sulfate 325 mg daily  Increase daily dietary intake by approximately 300 kcal above that for a la pregnancy, or 600 kcal over that of a nonpregnant woman and monitor weight gain  Twice weekly  testing (NST and MVP) assessment beginning at 32 weeks due to CHTN, IVF, and twin pregnancy  Given the increased risks of a twin pregnancy, prenatal visits every 2-3 weeks from 24 - 32/34 weeks and weekly prenatal visits thereafter    Delivery timing:   Normal growth and testin 0/7 - 38 6/7 weeks gestation   Normal growth, comorbid condition: 37 0/7 - 37 6/7 weeks gestation   FGR of one or both: 36 0/7 - 36 6/7 weeks gestation   FGR with abnormal UA Doppler, oligohydramnios, or multiple comorbid conditions: 32 0/7 - 34 6/7 weeks gestation    Comorbid conditions include: BMI >= 40, diabetes, hypertension, and complex maternal medical conditions associated with placental dysfunction (Lupus, renal disease, or other vascular disease).

## 2025-04-29 NOTE — ASSESSMENT & PLAN NOTE
Chronic Hypertension  Previously counseled  Recommendations (Please refer to [facility] guidelines):  Initiate aspirin 81 mg daily  for preeclampsia risk reduction  Continue current medications: Labetalol 100 BID  Baseline evaluation with primary OB: outlined previously  Continued close observation of patient's blood pressures. Avoid hypotension as this has been associated with uteroplacental insufficiency.  Recommend treatment to a goal blood pressure < 140/90

## 2025-05-28 ENCOUNTER — PATIENT MESSAGE (OUTPATIENT)
Dept: ADMINISTRATIVE | Facility: OTHER | Age: 35
End: 2025-05-28
Payer: COMMERCIAL

## 2025-06-18 ENCOUNTER — PATIENT MESSAGE (OUTPATIENT)
Dept: MATERNAL FETAL MEDICINE | Facility: CLINIC | Age: 35
End: 2025-06-18
Payer: COMMERCIAL